# Patient Record
Sex: MALE | Race: OTHER | HISPANIC OR LATINO
[De-identification: names, ages, dates, MRNs, and addresses within clinical notes are randomized per-mention and may not be internally consistent; named-entity substitution may affect disease eponyms.]

---

## 2021-05-12 PROBLEM — Z00.00 ENCOUNTER FOR PREVENTIVE HEALTH EXAMINATION: Status: ACTIVE | Noted: 2021-05-12

## 2021-07-01 DIAGNOSIS — Z01.812 ENCOUNTER FOR PREPROCEDURAL LABORATORY EXAMINATION: ICD-10-CM

## 2021-08-22 LAB — SARS-COV-2 N GENE NPH QL NAA+PROBE: NOT DETECTED

## 2021-08-23 ENCOUNTER — APPOINTMENT (OUTPATIENT)
Dept: PULMONOLOGY | Facility: CLINIC | Age: 51
End: 2021-08-23
Payer: COMMERCIAL

## 2021-08-23 VITALS
WEIGHT: 215 LBS | OXYGEN SATURATION: 96 % | HEART RATE: 80 BPM | HEIGHT: 72 IN | BODY MASS INDEX: 29.12 KG/M2 | SYSTOLIC BLOOD PRESSURE: 120 MMHG | DIASTOLIC BLOOD PRESSURE: 80 MMHG | RESPIRATION RATE: 16 BRPM | TEMPERATURE: 97.1 F

## 2021-08-23 DIAGNOSIS — Z83.79 FAMILY HISTORY OF OTHER DISEASES OF THE DIGESTIVE SYSTEM: ICD-10-CM

## 2021-08-23 DIAGNOSIS — Z78.9 OTHER SPECIFIED HEALTH STATUS: ICD-10-CM

## 2021-08-23 DIAGNOSIS — R94.2 ABNORMAL RESULTS OF PULMONARY FUNCTION STUDIES: ICD-10-CM

## 2021-08-23 DIAGNOSIS — Z57.9 OCCUPATIONAL EXPOSURE TO UNSPECIFIED RISK FACTOR: ICD-10-CM

## 2021-08-23 PROCEDURE — 99204 OFFICE O/P NEW MOD 45 MIN: CPT | Mod: 25

## 2021-08-23 PROCEDURE — 94729 DIFFUSING CAPACITY: CPT

## 2021-08-23 PROCEDURE — 95012 NITRIC OXIDE EXP GAS DETER: CPT

## 2021-08-23 PROCEDURE — 71046 X-RAY EXAM CHEST 2 VIEWS: CPT

## 2021-08-23 PROCEDURE — 94618 PULMONARY STRESS TESTING: CPT

## 2021-08-23 PROCEDURE — ZZZZZ: CPT

## 2021-08-23 PROCEDURE — 94010 BREATHING CAPACITY TEST: CPT

## 2021-08-23 PROCEDURE — 94727 GAS DIL/WSHOT DETER LNG VOL: CPT

## 2021-08-23 RX ORDER — FLUTICASONE FUROATE, UMECLIDINIUM BROMIDE AND VILANTEROL TRIFENATATE 100; 62.5; 25 UG/1; UG/1; UG/1
100-62.5-25 POWDER RESPIRATORY (INHALATION)
Qty: 3 | Refills: 1 | Status: ACTIVE | COMMUNITY
Start: 2021-08-23 | End: 1900-01-01

## 2021-08-23 RX ORDER — MULTIVITAMIN
TABLET ORAL
Refills: 0 | Status: ACTIVE | COMMUNITY

## 2021-08-23 RX ORDER — ALBUTEROL SULFATE 90 UG/1
108 (90 BASE) AEROSOL, METERED RESPIRATORY (INHALATION) EVERY 6 HOURS
Qty: 1 | Refills: 3 | Status: ACTIVE | COMMUNITY
Start: 2021-08-23 | End: 1900-01-01

## 2021-08-23 SDOH — HEALTH STABILITY - PHYSICAL HEALTH: OCCUPATIONAL EXPOSURE TO UNSPECIFIED RISK FACTOR: Z57.9

## 2021-08-23 NOTE — HISTORY OF PRESENT ILLNESS
[TextBox_4] : Mr. ANGUIANO is a 51 year old male history of [place] descent, nonsmoker, occupational exposure in workplace,\par  presenting to the office today for initial pulmonary evaluation. 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 at work site fire\par -he notes feeling winded\par -he notes intermittent cough\par -he notes intermittent wheezing when at work site fire\par -he denies issues with cold air but lindsey are worse\par -he notes constant dry nose and congestion \par -he notes neck size 17.5\par -he notes memory declined from baseline\par -he notes lack of sleep\par -he notes trouble falling asleep\par -he notes snoring\par - He denies being able to fall asleep as a passenger in a car for over an hour\par - He denies being able to fall asleep while watching a boring TV show \par -he denies nocturia \par -he notes LASIK \par -he notes SOB on stairs \par -he notes having fever but does not know if he had a fever \par \par -he denies any visual issues, headaches, nausea, vomiting, fever, chills, sweats, chest pain, chest pressure, diarrhea, constipation, dysphagia, dizziness, leg swelling, leg pain, itchy eyes, itchy ears, heartburn, reflux, sour taste in the mouth, myalgias or arthralgias.

## 2021-08-23 NOTE — ASSESSMENT
[FreeTextEntry1] : Mr. ANGUIANO  is a 51 year old male with a history of Slov[place] descent, nonsmoker, occupational exposure in workplace who now comes to the office for an initial pulmonary evaluation for SOB most consistent with mild persistent asthma, ?allergies/sinus, ?CHAPARRO\par \par His shortness of breath is multifactorial due to:\par -poor mechanics of breathing \par -out of shape \par - over weight \par - pulmonary disease\par    -mild restrictive dysfunction \par    -mild obstructive dysfunction (mild persistent asthma) \par \par problem 1: mild restrictive dysfunction\par -recommended weight loss and posture improvement  \par \par problem 2:mild persistent asthma \par -add Ventolin 2 puffs Q6H, pre-exercise \par -add Trelegy (100) 1 puff QD \par  - Asthma is believed to be caused by inherited (genetic) and environmental factor, but its exact cause is unknown. Asthma may be triggered by allergens, lung infections, or irritants in the air. Asthma triggers are different for each person. \par -Inhaler technique reviewed as well as oral hygiene techniques reviewed with patient. Avoidance of cold air, extremes of temperature, rescue inhaler should be used before exercise. Order of medication reviewed with patient. Recommended use of a cool mist humidifier in the bedroom. \par \par Problem 3:allergies/sinus\par - add Xlear OTC \par - script given for blood work: asthma profile, food IgE panel, eosinophil level, IgE level, Vitamin D level \par - Environmental measures for allergies were encouraged including mattress and pillow cover, air purifier, and environmental controls. \par \par Problem 4: Occupation exposure in workplace([employer])\par -no Rx at the moment \par \par Problem 5?CHAPARRO(neck size 17.5, snoring, tired)\par -complete Home Sleep study \par Sleep apnea is associated with adverse clinical consequences which can affect most organ systems.  Cardiovascular disease risk includes arrhythmias, atrial fibrillation, hypertension, coronary artery disease, and stroke. Metabolic disorders include diabetes type 2, non-alcoholic fatty liver disease. Mood disorder especially depression; and cognitive decline especially in the elderly. Associations with  chronic reflux/Pappas’s esophagus some but not all inclusive. \par -Reasons  include arousal consistent with hypopnea; respiratory events most prominent in REM sleep or supine position; therefore sleep staging and body position are important for accurate diagnosis and estimation of AHI. \par \par problem 6: poor breathing mechanics\par -recommended Wimhof and Buteyko breathing techniques \par -Proper breathing techniques were reviewed with an emphasis of exhalation. Patient instructed to breath in for 1 second and out for four seconds. Patient was encouraged to not talk while walking. \par \par problem 7:  overweight\par -Weight loss, exercise, and diet control were discussed and are highly encouraged. Treatment options are given such as, aqua therapy, and contacting a nutritionist. Recommended to use the elliptical, stationary bike, less use of treadmill. \par \par \par problem 8: health maintenance \par -recommended yearly flu shot after October 15\par -recommended strep pneumonia vaccines: Prevnar-13 vaccine, followed by Pneumo vaccine 23 one year following after 65 years old. \par -recommended early intervention for Upper Respiratory Infections (URIs)\par -recommended regular osteoporosis evaluations\par -recommended early dermatological evaluations\par -recommended after the age of 50 to the age of 70, colonoscopy every 5 years\par \par F/U in 6-8 weeks.\par He is encouraged to call with any changes, concerns, or questions\par

## 2021-08-23 NOTE — PROCEDURE
[FreeTextEntry1] :  CXR reveals  normal sized heart; there was no evidence of infiltrate or effusion -- A normal chest radiograph. \par \par FULL PFTs mild restrictive mild-moderate obstructive dysfunction; FEV1 was 2.71  L which is  65 % of predicted; normal lung volumes; normal diffusion of 28.7 , which is 99 % of predicted; normal flow volume loop \par \par 6 minute walk test reveals a low saturation of 97% with very slight dyspnea or fatigue; walked 652 meters\par \par  FENO 12 was ; normal value being less than 25\par Fractional exhaled nitric oxide (FENO) is regarded as a simple, noninvasive method for assessing eosinophilic airway inflammation. Produced by a variety of cells within the lung, nitric oxide (NO) concentrations are generally low in healthy individuals. However, high concentrations of NO appear to be involved in nonspecific host defense mechanisms and chronic inflammatory diseases such as asthma. The American Thoracic Society (ATS) therefore has recommended using FENO to aid in the diagnosis and monitoring of eosinophilic airway inflammation and asthma, and for identifying steroid responsive individuals whose chronic respiratory symptoms may be airway inflammation.\par

## 2021-08-23 NOTE — PHYSICAL EXAM
[No Acute Distress] : no acute distress [Normal Oropharynx] : normal oropharynx [Normal Appearance] : normal appearance [No Neck Mass] : no neck mass [Normal Rate/Rhythm] : normal rate/rhythm [Normal S1, S2] : normal s1, s2 [No Murmurs] : no murmurs [No Resp Distress] : no resp distress [Clear to Auscultation Bilaterally] : clear to auscultation bilaterally [No Abnormalities] : no abnormalities [Benign] : benign [Normal Gait] : normal gait [No Clubbing] : no clubbing [No Cyanosis] : no cyanosis [No Edema] : no edema [FROM] : FROM [Normal Color/ Pigmentation] : normal color/ pigmentation [No Focal Deficits] : no focal deficits [Oriented x3] : oriented x3 [Normal Affect] : normal affect [II] : Mallampati Class: II [TextBox_68] : I:E 1:3, mild expiratory wheeze

## 2021-08-23 NOTE — ADDENDUM
[FreeTextEntry1] : Documented by Jayden Alonso acting as a scribe for Dr. Temo Daniel on 08/23/2021 \par \par All medical record entries made by the Scribe were at my, Dr. Temo Daniel's, direction and personally dictated by me on 08/23/2021 . I have reviewed the chart and agree that the record accurately reflects my personal performance of the history, physical exam, assessment and plan. I have also personally directed, reviewed, and agree with the discharge instructions

## 2021-08-23 NOTE — REASON FOR VISIT
[Initial] : an initial visit [TextBox_44] : SOB most consistent with mild persistent asthma, ?allergies/sinus, ?CHAPARRO

## 2021-08-24 ENCOUNTER — TRANSCRIPTION ENCOUNTER (OUTPATIENT)
Age: 51
End: 2021-08-24

## 2021-10-04 ENCOUNTER — EMERGENCY (EMERGENCY)
Facility: HOSPITAL | Age: 51
LOS: 1 days | Discharge: HOME | End: 2021-10-04
Attending: EMERGENCY MEDICINE
Payer: COMMERCIAL

## 2021-10-04 VITALS
OXYGEN SATURATION: 98 % | HEART RATE: 90 BPM | RESPIRATION RATE: 18 BRPM | DIASTOLIC BLOOD PRESSURE: 70 MMHG | SYSTOLIC BLOOD PRESSURE: 104 MMHG | TEMPERATURE: 98 F

## 2021-10-04 VITALS
TEMPERATURE: 98 F | SYSTOLIC BLOOD PRESSURE: 111 MMHG | RESPIRATION RATE: 17 BRPM | HEART RATE: 90 BPM | OXYGEN SATURATION: 98 % | DIASTOLIC BLOOD PRESSURE: 75 MMHG | WEIGHT: 220.02 LBS

## 2021-10-04 PROCEDURE — 82962 GLUCOSE BLOOD TEST: CPT

## 2021-10-04 PROCEDURE — 99284 EMERGENCY DEPT VISIT MOD MDM: CPT

## 2021-10-04 PROCEDURE — 99285 EMERGENCY DEPT VISIT HI MDM: CPT

## 2021-10-04 NOTE — ED PROVIDER NOTE - PATIENT PORTAL LINK FT
You can access the FollowMyHealth Patient Portal offered by [facility] by registering at the following website: [email or web address]. By joining [employer]’s FollowMyHealth portal, you will also be able to view your health information using other applications (apps) compatible with our system.

## 2021-10-04 NOTE — ED PROVIDER NOTE - OBJECTIVE STATEMENT
52 yo M denies pmh presents for evaluation. Pt thinks he drank too much so slept in his car instead of driving. Denies other acute complaints.

## 2021-10-04 NOTE — ED PROVIDER NOTE - CLINICAL SUMMARY MEDICAL DECISION MAKING FREE TEXT BOX
52 yo M with etoh use. Denies acute complaints.     1230PM - pt slept in ED. [facility] wnl. Now clinically sober, steady on feet, will dc. Discussed indications for patient return to ED. Patient understood.

## 2021-10-04 NOTE — ED PROVIDER NOTE - NSFOLLOWUPINSTRUCTIONS_ED_ALL_ED_FT
ALCOHOL INTOXICATION - Discharge Care           Alcohol Intoxication    WHAT YOU NEED TO KNOW:    Alcohol intoxication is a harmful physical condition caused when you drink more alcohol than your body can handle. It is also called ethanol poisoning, or being drunk.    DISCHARGE INSTRUCTIONS:    Call your local emergency number (911 in the [place]) if:   •You have sudden trouble breathing or chest pain.      •You have a seizure.      •You feel sad enough to harm yourself or others.      Call your doctor if:   •You have hallucinations (you see or hear things that are not real).      •You cannot stop vomiting.      •You have questions or concerns about your condition or care.      Recommended alcohol limits:   •Men 21 to 64 years should limit alcohol to 2 drinks a day. Do not have more than 4 drinks in 1 day or more than 14 in 1 week.      •All women, and men 65 or older should limit alcohol to 1 drink in a day. Do not have more than 3 drinks in 1 day or more than 7 in 1 week. No amount of alcohol is okay during pregnancy.      Manage alcohol use:   •Decrease the amount you drink. This can help prevent health problems such as brain, heart, and liver damage, high blood pressure, diabetes, and cancer. If you cannot stop completely, healthcare providers can help you set goals to decrease the amount you drink.      •Plan weekly alcohol use. You will be less likely to drink more than the recommended limit if you plan ahead.      •Have food when you drink alcohol. Food will prevent alcohol from getting into your system too quickly. Eat before you have your first alcohol drink.      •Time your drinks carefully. Have no more than 1 drink in an hour. Have a liquid such as water, coffee, or a soft drink between alcohol drinks.      •Do not drive if you have had alcohol. Make sure someone who has not been drinking can help you get home.      •Do not drink alcohol if you are taking medicine. Alcohol is dangerous when you combine it with certain medicines, such as acetaminophen or blood pressure medicine. Talk to your healthcare provider about all the medicines you currently take.      For more information:   •Alcoholics Anonymous  Web Address: http://www.aa.org      •Substance Abuse and Mental Health Services Administration  [address]  [place] [address]  Web Address: http://www.[facility].gov        Follow up with your doctor as directed: Write down your questions so you remember to ask them during your visits.        © Copyright [employer] 2021           back to top                          © Copyright [employer] 2021

## 2021-10-25 ENCOUNTER — APPOINTMENT (OUTPATIENT)
Dept: PULMONOLOGY | Facility: CLINIC | Age: 51
End: 2021-10-25
Payer: COMMERCIAL

## 2021-10-25 VITALS
SYSTOLIC BLOOD PRESSURE: 120 MMHG | DIASTOLIC BLOOD PRESSURE: 80 MMHG | BODY MASS INDEX: 29.82 KG/M2 | HEART RATE: 94 BPM | RESPIRATION RATE: 16 BRPM | OXYGEN SATURATION: 97 % | HEIGHT: 71 IN | TEMPERATURE: 97.3 F | WEIGHT: 213 LBS

## 2021-10-25 PROCEDURE — 99214 OFFICE O/P EST MOD 30 MIN: CPT

## 2021-10-25 RX ORDER — FLUTICASONE FUROATE, UMECLIDINIUM BROMIDE AND VILANTEROL TRIFENATATE 200; 62.5; 25 UG/1; UG/1; UG/1
200-62.5-25 POWDER RESPIRATORY (INHALATION)
Qty: 3 | Refills: 1 | Status: ACTIVE | COMMUNITY
Start: 2021-10-25 | End: 1900-01-01

## 2021-10-25 NOTE — HISTORY OF PRESENT ILLNESS
[TextBox_4] : Mr. ANGUIANO is a 51 year old male history of Scottish descent, nonsmoker, occupational exposure in workplace,\par  presenting to the office today for initial pulmonary evaluation. His chief complaint is\par -he notes stopping all meds last July \par -he notes feeling well overall \par -he noted being on light duty \par -he notes needing shoulder surgery \par -he denies chest pressure and pain \par -He notes bowels are regular \par -he notes not going as much as he used to \par -he notes not being that much better than when he first met Dr. Daniel\par -he notes never getting an inhaler\par -he notes not being able to take his sleep study because insurance wouldn't cover it \par - He  denies any visual issues, headaches, nausea, vomiting, fever, chills, sweats, chest pains, chest pressure, diarrhea, constipation, dysphagia, myalgia, dizziness, leg swelling, leg pain, itchy eyes, itchy ears, heartburn, reflux, or sour taste in the mouth.

## 2021-10-25 NOTE — ADDENDUM
[FreeTextEntry1] : Documented by Main Haider acting as a scribe for Dr. Temo Daniel on 10/25/2021 \par \par All medical record entries made by the Scribe were at my, Dr. Temo Daniel's, direction and personally dictated by me on 10/25/2021 . I have reviewed the chart and agree that the record accurately reflects my personal performance of the history, physical exam, assessment and plan. I have also personally directed, reviewed, and agree with the discharge instructions.

## 2021-10-25 NOTE — ASSESSMENT
[FreeTextEntry1] : Mr. ANGUIANO  is a 51 year old male with a history of Monegasque descent, nonsmoker, occupational exposure in workplace who now comes to the office for an initial pulmonary evaluation for SOB most consistent with mild persistent asthma, ?allergies/sinus, ?CHAPARRO - NC meds/blood work \par \par His shortness of breath is multifactorial due to:\par -poor mechanics of breathing \par -out of shape \par - over weight \par - pulmonary disease\par    -mild restrictive dysfunction \par    -mild obstructive dysfunction (mild persistent asthma) \par \par problem 1: mild restrictive dysfunction\par -recommended weight loss and posture improvement  \par \par problem 2:mild persistent asthma \par -add Ventolin 2 puffs Q6H, pre-exercise \par -add Trelegy (200) 1 puff QD \par  - Asthma is believed to be caused by inherited (genetic) and environmental factor, but its exact cause is unknown. Asthma may be triggered by allergens, lung infections, or irritants in the air. Asthma triggers are different for each person. \par -Inhaler technique reviewed as well as oral hygiene techniques reviewed with patient. Avoidance of cold air, extremes of temperature, rescue inhaler should be used before exercise. Order of medication reviewed with patient. Recommended use of a cool mist humidifier in the bedroom. \par \par Problem 3:allergies/sinus\par - add Xlear OTC \par - script given for blood work: asthma profile, food IgE panel, eosinophil level, IgE level, Vitamin D level (NC)\par - Environmental measures for allergies were encouraged including mattress and pillow cover, air purifier, and environmental controls. \par \par Problem 4: Occupation exposure in workplace([employer])\par -no Rx at the moment \par \par Problem 5: ?CHAPARRO(neck size 17.5, snoring, tired)\par -complete in lab Sleep study \par Sleep apnea is associated with adverse clinical consequences which can affect most organ systems.  Cardiovascular disease risk includes arrhythmias, atrial fibrillation, hypertension, coronary artery disease, and stroke. Metabolic disorders include diabetes type 2, non-alcoholic fatty liver disease. Mood disorder especially depression; and cognitive decline especially in the elderly. Associations with  chronic reflux/Pappas’s esophagus some but not all inclusive. \par -Reasons  include arousal consistent with hypopnea; respiratory events most prominent in REM sleep or supine position; therefore sleep staging and body position are important for accurate diagnosis and estimation of AHI. \par \par problem 6: poor breathing mechanics\par -recommended Humera Barkley and Queta breathing techniques \par -Proper breathing techniques were reviewed with an emphasis of exhalation. Patient instructed to breath in for 1 second and out for four seconds. Patient was encouraged to not talk while walking. \par \par problem 7:  overweight\par -Weight loss, exercise, and diet control were discussed and are highly encouraged. Treatment options are given such as, aqua therapy, and contacting a nutritionist. Recommended to use the elliptical, stationary bike, less use of treadmill. \par \par \par problem 8: health maintenance \par -s/p Covid 19 Vaccine Moderna x2 \par -recommended yearly flu shot after October 15 (refused) \par -recommended strep pneumonia vaccines: Prevnar-13 vaccine, followed by Pneumo vaccine 23 one year following after 65 years old. \par -recommended early intervention for Upper Respiratory Infections (URIs)\par -recommended regular osteoporosis evaluations\par -recommended early dermatological evaluations\par -recommended after the age of 50 to the age of 70, colonoscopy every 5 years\par \par F/U in 6-8 weeks.\par He is encouraged to call with any changes, concerns, or questions\par

## 2022-03-25 ENCOUNTER — APPOINTMENT (OUTPATIENT)
Dept: SLEEP CENTER | Facility: CLINIC | Age: 52
End: 2022-03-25

## 2022-04-14 ENCOUNTER — APPOINTMENT (OUTPATIENT)
Dept: PULMONOLOGY | Facility: CLINIC | Age: 52
End: 2022-04-14

## 2023-01-13 ENCOUNTER — APPOINTMENT (OUTPATIENT)
Dept: UROLOGY | Facility: CLINIC | Age: 53
End: 2023-01-13

## 2023-08-02 ENCOUNTER — APPOINTMENT (OUTPATIENT)
Dept: PULMONOLOGY | Facility: CLINIC | Age: 53
End: 2023-08-02
Payer: COMMERCIAL

## 2023-08-02 ENCOUNTER — APPOINTMENT (OUTPATIENT)
Dept: PULMONOLOGY | Facility: CLINIC | Age: 53
End: 2023-08-02

## 2023-08-02 DIAGNOSIS — R09.82 POSTNASAL DRIP: ICD-10-CM

## 2023-08-02 PROCEDURE — 94010 BREATHING CAPACITY TEST: CPT

## 2023-08-02 PROCEDURE — 99214 OFFICE O/P EST MOD 30 MIN: CPT | Mod: 25

## 2023-08-02 PROCEDURE — 94729 DIFFUSING CAPACITY: CPT

## 2023-08-02 PROCEDURE — 95012 NITRIC OXIDE EXP GAS DETER: CPT

## 2023-08-02 PROCEDURE — 94727 GAS DIL/WSHOT DETER LNG VOL: CPT

## 2023-08-02 NOTE — ASSESSMENT
[FreeTextEntry1] : Mr. ANGUIANO  is a 51 year old male with a history of [place] descent, nonsmoker, occupational exposure in workplace who now comes to the office for an initial pulmonary evaluation for SOB most consistent with mild persistent asthma, ?allergies/sinus, ?CHAPARRO - NC; f/o Abnormal CT (nodule / Calcification)  His shortness of breath is multifactorial due to: -poor mechanics of breathing  -out of shape  - over weight  - pulmonary disease    -mild restrictive dysfunction     -mild obstructive dysfunction (mild persistent asthma)   problem 1: mild restrictive dysfunction -recommended weight loss and posture improvement    Problem 1A: Abnormal CT (Nodule) - next CT 7/2024 -cardiac eval Brown  CAT scans are the only radiological modality to identify abnormalities w/in the lungs with regards to nodules/masses/lymph nodes. Risks, benefits were reviewed in detail. The guidelines for abnormalities include follow up CT scans at various intervals which could range from 6 weeks to 1 year intervals. If there is a change for the worse then consideration for a biopsy will be considered if you are a candidate. Second opinion evaluation with a thoracic surgeon or an interventional radiologist could be offered.  problem 2:mild persistent asthma  -add Ventolin 2 puffs Q6H, pre-exercise  -add Trelegy (200) 1 puff QD   - Asthma is believed to be caused by inherited (genetic) and environmental factor, but its exact cause is unknown. Asthma may be triggered by allergens, lung infections, or irritants in the air. Asthma triggers are different for each person.  -Inhaler technique reviewed as well as oral hygiene techniques reviewed with patient. Avoidance of cold air, extremes of temperature, rescue inhaler should be used before exercise. Order of medication reviewed with patient. Recommended use of a cool mist humidifier in the bedroom.   Problem 3:allergies/sinus - add Xlear OTC  - script given for blood work: asthma profile, food IgE panel, eosinophil level, IgE level, Vitamin D level (NC) - Environmental measures for allergies were encouraged including mattress and pillow cover, air purifier, and environmental controls.   Problem 4: Occupation exposure in workplace([employer]) -no Rx at the moment   Problem 5: ?CHAPARRO(neck size 17.5, snoring, tired) -complete in lab Sleep study  Sleep apnea is associated with adverse clinical consequences which can affect most organ systems.  Cardiovascular disease risk includes arrhythmias, atrial fibrillation, hypertension, coronary artery disease, and stroke. Metabolic disorders include diabetes type 2, non-alcoholic fatty liver disease. Mood disorder especially depression; and cognitive decline especially in the elderly. Associations with  chronic reflux/Pappas's esophagus some but not all inclusive.  -Reasons  include arousal consistent with hypopnea; respiratory events most prominent in REM sleep or supine position; therefore sleep staging and body position are important for accurate diagnosis and estimation of AHI.   problem 6: poor breathing mechanics -recommended Wimakeda Barkley and Buteyko breathing techniques  -Proper breathing techniques were reviewed with an emphasis of exhalation. Patient instructed to breath in for 1 second and out for four seconds. Patient was encouraged to not talk while walking.   problem 7:  overweight -Weight loss, exercise, and diet control were discussed and are highly encouraged. Treatment options are given such as, aqua therapy, and contacting a nutritionist. Recommended to use the elliptical, stationary bike, less use of treadmill.    problem 8: health maintenance  -s/p Covid 19 Vaccine Moderna x2  -recommended yearly flu shot after October 15 (refused)  -recommended strep pneumonia vaccines: Prevnar-13 vaccine, followed by Pneumo vaccine 23 one year following after 65 years old.  -recommended early intervention for Upper Respiratory Infections (URIs) -recommended regular osteoporosis evaluations -recommended early dermatological evaluations -recommended after the age of 50 to the age of 70, colonoscopy every 5 years  F/U in 6-8 weeks. He is encouraged to call with any changes, concerns, or questions

## 2023-08-02 NOTE — ADDENDUM
[FreeTextEntry1] : Documented by Eligio Arzola acting as a scribe for Dr. Temo Daniel on 08/02/2023 .  All medical record entries made by the Scribe were at my, Dr. Temo Daniel's, direction and personally dictated by me on 08/02/2023 . I have reviewed the chart and agree that the record accurately reflects my personal performance of the history, physical exam, assessment and plan. I have also personally directed, reviewed, and agree with the discharge instructions.

## 2023-08-02 NOTE — PROCEDURE
[FreeTextEntry1] : CT Chest (7.27.2023) revealed Stable middle lobe pulmonary nodules, which are favored benign. Stable benign calcified granulomas, No follow up of these nodules is necessary based on current guidelines. Possible mild bronchitis, which appears unchanged. No evidence of pulmonary fibrosis / interstitial lung disease. Mild coronary artery calcification.  Full PFT reveals normal flows; FEV1 was 3.40 L which is 82% of predicted; normal lung volumes; normal diffusion at 34.6, which is 125% of predicted; normal flow volume loop. PFTs were performed to evaluate for Asthma  FENO was 18; a normal value being less than 25 Fractional exhaled nitric oxide (FENO) is regarded as a simple, noninvasive method for assessing eosinophilic airway inflammation. Produced by a variety of cells within the lung, nitric oxide (NO) concentrations are generally low in healthy individuals. However, high concentrations of NO appear to be involved in nonspecific host defense mechanisms and chronic inflammatory diseases such as asthma. The American Thoracic Society (ATS) therefore has recommended using FENO to aid in the diagnosis and monitoring of eosinophilic airway inflammation and asthma, and for identifying steroid responsive individuals whose chronic respiratory symptoms may be caused by airway inflammation.

## 2023-08-02 NOTE — REASON FOR VISIT
[Follow-Up] : a follow-up visit [TextBox_44] : SOB most consistent with mild persistent asthma, ?allergies/sinus, ?CHAPARRO

## 2023-08-02 NOTE — HISTORY OF PRESENT ILLNESS
[TextBox_4] : Mr. ANGUIANO is a 52 year old male history of English descent, nonsmoker, occupational exposure in workplace,  presenting to the office today for follow up pulmonary evaluation. His chief complaint is  - he notes losing 25 pounds - he notes feeling generally well - he notes resistance training for exercise - he notes sleeping well - he notes SOB during heavy exertion -he denies any headaches, nausea, emesis, fever, chills, sweats, chest pain, chest pressure, coughing, wheezing, palpitations, constipation, diarrhea, vertigo, dysphagia, heartburn, reflux, itchy eyes, itchy ears, leg swelling, leg pain, arthralgias, myalgias, or sour taste in the mouth.

## 2024-02-02 ENCOUNTER — APPOINTMENT (OUTPATIENT)
Dept: PULMONOLOGY | Facility: CLINIC | Age: 54
End: 2024-02-02
Payer: COMMERCIAL

## 2024-02-02 VITALS
TEMPERATURE: 97.2 F | WEIGHT: 195 LBS | BODY MASS INDEX: 27.3 KG/M2 | OXYGEN SATURATION: 98 % | HEIGHT: 71 IN | HEART RATE: 81 BPM | SYSTOLIC BLOOD PRESSURE: 102 MMHG | DIASTOLIC BLOOD PRESSURE: 64 MMHG | RESPIRATION RATE: 16 BRPM

## 2024-02-02 DIAGNOSIS — R06.83 SNORING: ICD-10-CM

## 2024-02-02 DIAGNOSIS — R93.89 ABNORMAL FINDINGS ON DIAGNOSTIC IMAGING OF OTHER SPECIFIED BODY STRUCTURES: ICD-10-CM

## 2024-02-02 DIAGNOSIS — J43.9 EMPHYSEMA, UNSPECIFIED: ICD-10-CM

## 2024-02-02 DIAGNOSIS — J98.4 EMPHYSEMA, UNSPECIFIED: ICD-10-CM

## 2024-02-02 DIAGNOSIS — J45.30 MILD PERSISTENT ASTHMA, UNCOMPLICATED: ICD-10-CM

## 2024-02-02 DIAGNOSIS — R06.02 SHORTNESS OF BREATH: ICD-10-CM

## 2024-02-02 DIAGNOSIS — Z57.9 OCCUPATIONAL EXPOSURE TO UNSPECIFIED RISK FACTOR: ICD-10-CM

## 2024-02-02 PROCEDURE — 99214 OFFICE O/P EST MOD 30 MIN: CPT | Mod: 25

## 2024-02-02 PROCEDURE — 94010 BREATHING CAPACITY TEST: CPT

## 2024-02-02 SDOH — HEALTH STABILITY - PHYSICAL HEALTH: OCCUPATIONAL EXPOSURE TO UNSPECIFIED RISK FACTOR: Z57.9

## 2024-02-02 NOTE — PROCEDURE
[FreeTextEntry1] : PFTs revealed mild obstructive dysfunction; FEV1 was 3.04L, which is 75.6% of predicted; normal flow volume loop. PFTs were performed to evaluate for SOB, asthma

## 2024-02-02 NOTE — HISTORY OF PRESENT ILLNESS
[TextBox_4] : Mr. ANGUIANO is a 53 year old male history of [place] descent, nonsmoker, occupational exposure in workplace, presenting to the office today for a follow-up pulmonary evaluation. His chief complaint is   -he notes feeling generally well  -he notes bowels are regular  -he notes his breathing is stable with the cold air. He denies any chest tightness or coughing -he notes his sinuses are dry -he notes exercising (gym) 3 days per week without limitations in his breathing. He has some limitations in his shoulder s/p surgery -he notes he's taking supplemental protein and creatine -he notes he's trying to weigh 200 lbs. He wants to gain more muscle mass -he notes good quality of sleep  -he denies snoring  -he denies any headaches, nausea, emesis, fever, chills, sweats, chest pain, chest pressure, coughing, wheezing, palpitations, diarrhea, constipation, dysphagia, vertigo, arthralgias, myalgias, leg swelling, itchy eyes, itchy ears, heartburn, reflux, or sour taste in the mouth.

## 2024-02-02 NOTE — ASSESSMENT
[FreeTextEntry1] : Mr. ANGUIANO is a 53 year old male with a history of [place] descent, nonsmoker, occupational exposure in workplace who now comes to the office for a follow-up pulmonary evaluation for SOB most consistent with mild persistent asthma, ?allergies/sinus, ?CHAPARRO - NC; abnormal CT (nodule / Calcification)  His shortness of breath is multifactorial due to: -poor mechanics of breathing -out of shape - over weight - pulmonary disease  -mild restrictive dysfunction  -mild obstructive dysfunction (mild persistent asthma)  problem 1: mild restrictive dysfunction -recommended weight loss and posture improvement  Problem 1A: Abnormal CT (Nodule) - next chest CT 7/2024 -cardiac eval Brown CAT scans are the only radiological modality to identify abnormalities w/in the lungs with regards to nodules/masses/lymph nodes. Risks, benefits were reviewed in detail. The guidelines for abnormalities include follow up CT scans at various intervals which could range from 6 weeks to 1 year intervals. If there is a change for the worse then consideration for a biopsy will be considered if you are a candidate. Second opinion evaluation with a thoracic surgeon or an interventional radiologist could be offered.  problem 2: mild persistent asthma ([facility], failed [facility])- WTC -Ventolin 2 puffs Q6H, pre-exercise -Trelegy (200) 1 puff QD  - Asthma is believed to be caused by inherited (genetic) and environmental factor, but its exact cause is unknown. Asthma may be triggered by allergens, lung infections, or irritants in the air. Asthma triggers are different for each person. -Inhaler technique reviewed as well as oral hygiene techniques reviewed with patient. Avoidance of cold air, extremes of temperature, rescue inhaler should be used before exercise. Order of medication reviewed with patient. Recommended use of a cool mist humidifier in the bedroom.  Problem 3:allergies/sinus - Xlear OTC -add Vicks Sinex saline nasal spray  - script given for blood work: asthma profile, food IgE panel, eosinophil level, IgE level, Vitamin D level (NC) - Environmental measures for allergies were encouraged including mattress and pillow cover, air purifier, and environmental controls.  Problem 4: Occupation exposure in workplace([employer]) -no Rx at the moment  Problem 5: ?CHAPARRO (neck size 17.5, snoring, tired)- NC -complete in lab Sleep study Sleep apnea is associated with adverse clinical consequences which can affect most organ systems. Cardiovascular disease risk includes arrhythmias, atrial fibrillation, hypertension, coronary artery disease, and stroke. Metabolic disorders include diabetes type 2, non-alcoholic fatty liver disease. Mood disorder especially depression; and cognitive decline especially in the elderly. Associations with chronic reflux/Pappas's esophagus some but not all inclusive. -Reasons include arousal consistent with hypopnea; respiratory events most prominent in REM sleep or supine position; therefore sleep staging and body position are important for accurate diagnosis and estimation of AHI.  problem 6: poor breathing mechanics -recommended Wimakeda Reedf and Buteyko breathing techniques -Proper breathing techniques were reviewed with an emphasis of exhalation. Patient instructed to breath in for 1 second and out for four seconds. Patient was encouraged to not talk while walking.  problem 7: overweight -Weight loss, exercise, and diet control were discussed and are highly encouraged. Treatment options are given such as, aqua therapy, and contacting a nutritionist. Recommended to use the elliptical, stationary bike, less use of treadmill.  problem 8: health maintenance -s/p Covid 19 Vaccine Moderna x2 -recommended yearly flu shot after October 15 (refused) -recommended strep pneumonia vaccines: Prevnar-13 vaccine, followed by Pneumo vaccine 23 one year following after 65 years old. -recommended early intervention for Upper Respiratory Infections (URIs) -recommended regular osteoporosis evaluations -recommended early dermatological evaluations -recommended after the age of 50 to the age of 70, colonoscopy every 5 years  F/P in 3-4 months  He is encouraged to call with any changes, concerns, or questions.

## 2024-02-02 NOTE — ADDENDUM
[FreeTextEntry1] : Documented by Sheela Reich acting as a scribe for Dr. Temo Daniel on 02/02/2024. All medical record entries made by the Scribe were at my, Dr. Temo Daniel's, direction and personally dictated by me on 02/02/2024. I have reviewed the chart and agree that the record accurately reflects my personal performance of the history, physical exam, assessment and plan. I have also personally directed, reviewed, and agree with the discharge instructions.

## 2024-08-20 ENCOUNTER — APPOINTMENT (OUTPATIENT)
Dept: PULMONOLOGY | Facility: CLINIC | Age: 54
End: 2024-08-20